# Patient Record
Sex: FEMALE | Race: WHITE | NOT HISPANIC OR LATINO | Employment: FULL TIME | URBAN - METROPOLITAN AREA
[De-identification: names, ages, dates, MRNs, and addresses within clinical notes are randomized per-mention and may not be internally consistent; named-entity substitution may affect disease eponyms.]

---

## 2022-05-25 ENCOUNTER — EVALUATION (OUTPATIENT)
Dept: PHYSICAL THERAPY | Facility: CLINIC | Age: 52
End: 2022-05-25
Payer: COMMERCIAL

## 2022-05-25 DIAGNOSIS — S83.241D TEAR OF MEDIAL MENISCUS OF RIGHT KNEE, CURRENT, UNSPECIFIED TEAR TYPE, SUBSEQUENT ENCOUNTER: Primary | ICD-10-CM

## 2022-05-25 PROCEDURE — 97161 PT EVAL LOW COMPLEX 20 MIN: CPT

## 2022-05-25 PROCEDURE — 97112 NEUROMUSCULAR REEDUCATION: CPT

## 2022-05-25 NOTE — LETTER
May 25, 2022    Luna Palm MD  [address] [place] [address]    Patient: Katherine Moore   YOB: 1970   Date of Visit: 2022     Encounter Diagnosis     ICD-10-CM    1  Tear of medial meniscus of right knee, current, unspecified tear type, subsequent encounter  S83 241D        Dear Dr Lucille Winter:    Thank you for your recent referral of Katherine Moore  Please review the attached evaluation summary from Kirsten's recent visit  Please verify that you agree with the plan of care by signing the attached order  If you have any questions or concerns, please do not hesitate to call  I sincerely appreciate the opportunity to share in the care of one of your patients and hope to have another opportunity to work with you in the near future  Sincerely,    Izabella Vieyra, PT      Referring Provider:      I certify that I have read the below Plan of Care and certify the need for these services furnished under this plan of treatment while under my care  Luna Palm MD  [address]  Via Fax: [phone]          PT Evaluation     Today's date: 2022  Patient name: Katherine Moore  : 1970  MRN: [id number]  Referring provider: Silver Johnson*  Dx:   Encounter Diagnosis     ICD-10-CM    1  Tear of medial meniscus of right knee, current, unspecified tear type, subsequent encounter  S83 241D        Start Time: 1530  Stop Time: 1630  Total time in clinic (min): 60 minutes    Assessment  Assessment details: Patient is a 45 yo female presenting to physical therapy with symptoms consistent with R knee medial meniscal tear that started about 6 months ago  Patient presents with decreased hip and knee strength, decreased R knee PROM and AROM, extensor lag and decreased activity tolerance  Patient has increased difficulty with standing, walking, stairs, floor transfers and kneeling   PT will address the noted impairments by performing hip and knee strengthening, stretching, balance, functional activities and manual techniques to allow the patient to return to her PLOF  PT recommended 2x/week for 4 weeks c a good prognosis 2* PLOF  Impairments: abnormal or restricted ROM, activity intolerance, impaired physical strength, lacks appropriate home exercise program, pain with function, poor posture  and poor body mechanics    Symptom irritability: lowUnderstanding of Dx/Px/POC: good   Prognosis: good    Goals  STG: In four weeks the patient will:    1  Be (I) with her HEP  2  Increase hip and knee strength to 4+/5 MMT score to assist c standing and stairs  3  Increase R knee flexion AROM to 120 degrees to assist c transfers and kneeling  LTG: In six weeks, the patient will:    1  Increase FOTO score to 74 to demonstrate improvements in symptoms and function  2  Demonstrate full R knee AROM without pain  3  Kneel on the floor with her grandchildren without pain  4  Increase hip and knee strength to 5/5 MMT score to assist c prolonged activities  5  Walk 1-2 miles without pain  6  Navigate the stairs without pain          Plan  Patient would benefit from: skilled physical therapy and PT eval  Planned modality interventions: cryotherapy and thermotherapy: hydrocollator packs  Planned therapy interventions: abdominal trunk stabilization, joint mobilization, manual therapy, massage, Browne taping, balance, body mechanics training, breathing training, neuromuscular re-education, patient education, postural training, strengthening, stretching, therapeutic activities, therapeutic exercise, transfer training, home exercise program, gait training, functional ROM exercises and flexibility  Frequency: 2x week  Duration in visits: 8  Duration in weeks: 4  Plan of Care beginning date: 5/25/2022  Plan of Care expiration date: 6/22/2022  Treatment plan discussed with: patient        Subjective Evaluation    History of Present Illness  Mechanism of injury: Patient has had R knee pain for a while, however about 6 months ago she noted an increase in pain and symptoms  Patient noted she is on the floor a lot with her grandchildren and she has increased pain with kneeling, standing, walking and stairs  Patient noted no TERESA  Patient reported no numbness or tingling  Patient noted she is planning on having surgery in the future due to her functional limitations  Recurrent probem    Quality of life: good    Pain  Current pain ratin  At best pain ratin  At worst pain ratin  Location: R medial knee  Quality: throbbing and discomfort  Relieving factors: rest  Aggravating factors: standing, walking, stair climbing and running  Progression: worsening    Social Support  Steps to enter house: yes  13  Lives in: [facility]  Lives with: spouse    Employment status: working ([employer] )  Hand dominance: right    Patient Goals  Patient goals for therapy: increased strength, independence with ADLs/IADLs, return to sport/leisure activities, increased motion, improved balance and decreased pain  Patient goal: "to get stronger " "to walk without pain " "to play with her grandchildren without pain "        Objective     Tenderness   Left Knee   Tenderness in the patellar tendon  Right Knee   Tenderness in the MCL (distal), MCL (proximal), medial joint line and medial patella  No tenderness in the lateral joint line and lateral patella       Neurological Testing     Sensation     Knee   Left Knee   Intact: light touch    Right Knee   Intact: light touch     Active Range of Motion   Left Knee   Normal active range of motion    Right Knee   Flexion: 110 degrees with pain  Extension: 5 degrees with pain    Passive Range of Motion   Left Knee   Normal passive range of motion    Right Knee   Flexion: 130 degrees with pain  Extension: 0 degrees with pain    Mobility   Patellar Mobility:   Left Knee   WFL: medial, lateral, superior and inferior  Right Knee   WFL: superior and inferior  Hypomobile: medial and lateral   Tibiofemoral Mobility:   Right knee Tibiofemoral tendons within functional limits include the anterior and posterior  Strength/Myotome Testing     Left Hip   Planes of Motion   Flexion: 4  Extension: 4  Abduction: 4  Adduction: 4-    Right Hip   Planes of Motion   Flexion: 4  Extension: 4-  Abduction: 4  Adduction: 4-    Left Knee   Flexion: 4+  Extension: 4+  Quadriceps contraction: good    Right Knee   Flexion: 3+  Extension: 3+  Quadriceps contraction: fair    Additional Strength Details  x10 SLR mild extensor lag present    Tests     Right Knee   Negative anterior drawer and posterior drawer         Flowsheet Rows    Flowsheet Row Most Recent Value   PT/OT G-Codes    Current Score 64   Projected Score 74   Assessment Type Evaluation             Precautions: hx of asthma -inhaler PRN      Manuals 5/25            Patellar mobs (sup/inf & med/lat) MW  Grade  III                                                   Neuro Re-Ed             SLR x10 ea            Quad set nv            Eccentric leg press nv            bridge x10            clams x10 ea                                      Ther Ex             X-ride nv            sidelying hip abd nv            Hip add nv            Hamstring stretch nv            Hip adductor stretch nv            ITB strap stretch nv            Piriformis stretch nv            TKE nv            Ther Activity                                       Gait Training                                       Modalities

## 2022-05-25 NOTE — PROGRESS NOTES
PT Evaluation     Today's date: 2022  Patient name: Joaquin Hughes  : 1970  MRN: [id number]  Referring provider: Margie French*  Dx:   Encounter Diagnosis     ICD-10-CM    1  Tear of medial meniscus of right knee, current, unspecified tear type, subsequent encounter  S83 241D        Start Time: 1530  Stop Time: 1630  Total time in clinic (min): 60 minutes    Assessment  Assessment details: Patient is a 45 yo female presenting to physical therapy with symptoms consistent with R knee medial meniscal tear that started about 6 months ago  Patient presents with decreased hip and knee strength, decreased R knee PROM and AROM, extensor lag and decreased activity tolerance  Patient has increased difficulty with standing, walking, stairs, floor transfers and kneeling  PT will address the noted impairments by performing hip and knee strengthening, stretching, balance, functional activities and manual techniques to allow the patient to return to her PLOF  PT recommended 2x/week for 4 weeks c a good prognosis 2* PLOF  Impairments: abnormal or restricted ROM, activity intolerance, impaired physical strength, lacks appropriate home exercise program, pain with function, poor posture  and poor body mechanics    Symptom irritability: lowUnderstanding of Dx/Px/POC: good   Prognosis: good    Goals  STG: In four weeks the patient will:    1  Be (I) with her HEP  2  Increase hip and knee strength to 4+/5 MMT score to assist c standing and stairs  3  Increase R knee flexion AROM to 120 degrees to assist c transfers and kneeling  LTG: In six weeks, the patient will:    1  Increase FOTO score to 74 to demonstrate improvements in symptoms and function  2  Demonstrate full R knee AROM without pain  3  Kneel on the floor with her grandchildren without pain  4  Increase hip and knee strength to 5/5 MMT score to assist c prolonged activities  5  Walk 1-2 miles without pain    6  Navigate the stairs without pain  Plan  Patient would benefit from: skilled physical therapy and PT eval  Planned modality interventions: cryotherapy and thermotherapy: hydrocollator packs  Planned therapy interventions: abdominal trunk stabilization, joint mobilization, manual therapy, massage, Browne taping, balance, body mechanics training, breathing training, neuromuscular re-education, patient education, postural training, strengthening, stretching, therapeutic activities, therapeutic exercise, transfer training, home exercise program, gait training, functional ROM exercises and flexibility  Frequency: 2x week  Duration in visits: 8  Duration in weeks: 4  Plan of Care beginning date: 2022  Plan of Care expiration date: 2022  Treatment plan discussed with: patient        Subjective Evaluation    History of Present Illness  Mechanism of injury: Patient has had R knee pain for a while, however about 6 months ago she noted an increase in pain and symptoms  Patient noted she is on the floor a lot with her grandchildren and she has increased pain with kneeling, standing, walking and stairs  Patient noted no TERESA  Patient reported no numbness or tingling  Patient noted she is planning on having surgery in the future due to her functional limitations             Recurrent probem    Quality of life: good    Pain  Current pain ratin  At best pain ratin  At worst pain ratin  Location: R medial knee  Quality: throbbing and discomfort  Relieving factors: rest  Aggravating factors: standing, walking, stair climbing and running  Progression: worsening    Social Support  Steps to enter house: yes  13  Lives in: [facility]  Lives with: spouse    Employment status: working ([employer] )  Hand dominance: right    Patient Goals  Patient goals for therapy: increased strength, independence with ADLs/IADLs, return to sport/leisure activities, increased motion, improved balance and decreased pain  Patient goal: "to get stronger " "to walk without pain " "to play with her grandchildren without pain "        Objective     Tenderness   Left Knee   Tenderness in the patellar tendon  Right Knee   Tenderness in the MCL (distal), MCL (proximal), medial joint line and medial patella  No tenderness in the lateral joint line and lateral patella  Neurological Testing     Sensation     Knee   Left Knee   Intact: light touch    Right Knee   Intact: light touch     Active Range of Motion   Left Knee   Normal active range of motion    Right Knee   Flexion: 110 degrees with pain  Extension: 5 degrees with pain    Passive Range of Motion   Left Knee   Normal passive range of motion    Right Knee   Flexion: 130 degrees with pain  Extension: 0 degrees with pain    Mobility   Patellar Mobility:   Left Knee   WFL: medial, lateral, superior and inferior  Right Knee   WFL: superior and inferior  Hypomobile: medial and lateral   Tibiofemoral Mobility:   Right knee Tibiofemoral tendons within functional limits include the anterior and posterior  Strength/Myotome Testing     Left Hip   Planes of Motion   Flexion: 4  Extension: 4  Abduction: 4  Adduction: 4-    Right Hip   Planes of Motion   Flexion: 4  Extension: 4-  Abduction: 4  Adduction: 4-    Left Knee   Flexion: 4+  Extension: 4+  Quadriceps contraction: good    Right Knee   Flexion: 3+  Extension: 3+  Quadriceps contraction: fair    Additional Strength Details  x10 SLR mild extensor lag present    Tests     Right Knee   Negative anterior drawer and posterior drawer         Flowsheet Rows    Flowsheet Row Most Recent Value   PT/OT G-Codes    Current Score 64   Projected Score 74   Assessment Type Evaluation             Precautions: hx of asthma -inhaler PRN      Manuals 5/25            Patellar mobs (sup/inf & med/lat) MW  Grade  III                                                   Neuro Re-Ed             SLR x10 ea            Quad set nv            Eccentric leg press nv bridge x10            clams x10 ea                                      Ther Ex             X-ride nv            sidelying hip abd nv            Hip add nv            Hamstring stretch nv            Hip adductor stretch nv            ITB strap stretch nv            Piriformis stretch nv            TKE nv            Ther Activity                                       Gait Training                                       Modalities

## 2022-05-31 ENCOUNTER — OFFICE VISIT (OUTPATIENT)
Dept: PHYSICAL THERAPY | Facility: CLINIC | Age: 52
End: 2022-05-31
Payer: COMMERCIAL

## 2022-05-31 DIAGNOSIS — S83.241D TEAR OF MEDIAL MENISCUS OF RIGHT KNEE, CURRENT, UNSPECIFIED TEAR TYPE, SUBSEQUENT ENCOUNTER: Primary | ICD-10-CM

## 2022-05-31 PROCEDURE — 97110 THERAPEUTIC EXERCISES: CPT

## 2022-05-31 PROCEDURE — 97112 NEUROMUSCULAR REEDUCATION: CPT

## 2022-05-31 NOTE — PROGRESS NOTES
Daily Note     Today's date: 2022  Patient name: Blanco Raymond  : 1970  MRN: [id number]  Referring provider: Karolina Rowell*  Dx:   Encounter Diagnosis     ICD-10-CM    1  Tear of medial meniscus of right knee, current, unspecified tear type, subsequent encounter  S83 241D        Start Time: 730  Stop Time: 823  Total time in clinic (min): 53 minutes    Subjective: Patient noted she was active this weekend and noted some soreness in the knee  Patient noted the knee is very stiff today  Objective: See treatment diary below      Assessment: Patient performed various strengthening exercises with min VCs for form  Patient noted some soreness with the bridges and piriformis stretch  PT educated the patient on performing exercises in pain free ranges  Patient would benefit from continued PT to allow the patient to return to her PLOF  Plan: Continue per plan of care        Precautions: hx of asthma -inhaler PRN      Manuals            Patellar mobs (sup/inf & med/lat) MW  Grade  III                                                   Neuro Re-Ed             SLR x10 ea x15 ea           Quad set nv nv           Eccentric leg press nv nv           bridge x10 x10           clams x10 ea x20 ea           3 way hip  nv                        Ther Ex             X-ride nv 10'           sidelying hip abd nv x25           Hip add nv x25  5" hold           Hamstring stretch nv 3x30" ea           Hip adductor stretch nv 3x30" ea           ITB strap stretch nv 3x30" ea           Piriformis stretch nv 3x30" ea           TKE nv nv           Ther Activity                                       Gait Training                                       Modalities

## 2022-06-02 ENCOUNTER — OFFICE VISIT (OUTPATIENT)
Dept: PHYSICAL THERAPY | Facility: CLINIC | Age: 52
End: 2022-06-02
Payer: COMMERCIAL

## 2022-06-02 DIAGNOSIS — S83.241D TEAR OF MEDIAL MENISCUS OF RIGHT KNEE, CURRENT, UNSPECIFIED TEAR TYPE, SUBSEQUENT ENCOUNTER: Primary | ICD-10-CM

## 2022-06-02 PROCEDURE — 97110 THERAPEUTIC EXERCISES: CPT

## 2022-06-02 PROCEDURE — 97112 NEUROMUSCULAR REEDUCATION: CPT

## 2022-06-02 NOTE — PROGRESS NOTES
Daily Note     Today's date: 2022  Patient name: Liyah Garcia  : 1970  MRN: [id number]  Referring provider: Emile Mitchell*  Dx:   Encounter Diagnosis     ICD-10-CM    1  Tear of medial meniscus of right knee, current, unspecified tear type, subsequent encounter  S83 241D        Start Time: 1115  Stop Time: 1200  Total time in clinic (min): 45 minutes    Subjective: Patient noted that she fell yesterday and her knee is a little sore  Patient noted that the stretches have been helping  Objective: See treatment diary below      Assessment: PT held progression of exercises due to her pain levels  Patient performed the exercises with min VCs  Patient noted some soreness in the R knee during the bridges  PT educated the patient on continuing her stretches to decrease pain  Patient would benefit from continued PT to allow the patient to return to her PLOF  Plan: Continue per plan of care        Precautions: hx of asthma -inhaler PRN      Manuals  6          Patellar mobs (sup/inf & med/lat) MW  Grade  III                                                   Neuro Re-Ed             SLR x10 ea x15 ea x15 ea          Quad set nv nv           Eccentric leg press nv nv           bridge x10 x10 x10          clams x10 ea x20 ea x20 ea          3 way hip  nv nv                       Ther Ex             X-ride nv 10' 10'          sidelying hip abd nv x25 x25 ea          Calf stretch   3x30" ea          Heel raises   2x10          Hip add nv x25  5" hold x25  5" hold          Hamstring stretch nv 3x30" ea 3x30" ea          Hip adductor stretch nv 3x30" ea 3x30" ea          ITB strap stretch nv 3x30" ea 3x30" ea          Piriformis stretch nv 3x30" ea 3x30"ea          TKE nv nv           Ther Activity                                       Gait Training                                       Modalities

## 2022-06-06 ENCOUNTER — OFFICE VISIT (OUTPATIENT)
Dept: PHYSICAL THERAPY | Facility: CLINIC | Age: 52
End: 2022-06-06
Payer: COMMERCIAL

## 2022-06-06 DIAGNOSIS — S83.241D TEAR OF MEDIAL MENISCUS OF RIGHT KNEE, CURRENT, UNSPECIFIED TEAR TYPE, SUBSEQUENT ENCOUNTER: Primary | ICD-10-CM

## 2022-06-06 PROCEDURE — 97112 NEUROMUSCULAR REEDUCATION: CPT

## 2022-06-06 PROCEDURE — 97110 THERAPEUTIC EXERCISES: CPT

## 2022-06-06 NOTE — PROGRESS NOTES
Daily Note     Today's date: 2022  Patient name: Radha Lino  : 1970  MRN: [id number]  Referring provider: Beto Luna*  Dx:   Encounter Diagnosis     ICD-10-CM    1  Tear of medial meniscus of right knee, current, unspecified tear type, subsequent encounter  S83 241D        Start Time: 1200  Stop Time: 1240  Total time in clinic (min): 40 minutes    Subjective: Patient noted she went biking this weekend with some pain in the knee  Patient noted she is sore in the knee today  Objective: See treatment diary below      Assessment: PT introduced the leg press to assist c strength  Patient performed without full knee extension due to soreness  Patient noted increased clicking during the session  Due to the increased clicking and pain, the session was limited  PT educated the patient on icing to decrease pain at home and continue to perform her stretches  Patient would benefit from continued PT to allow the patient to return to her PLOF  Plan: Continue per plan of care        Precautions: hx of asthma -inhaler PRN      Manuals          Patellar mobs (sup/inf & med/lat) MW  Grade  III                                                   Neuro Re-Ed             SLR x10 ea x15 ea x15 ea x10 R  x15 L         Quad set nv nv           Eccentric leg press nv nv  DL  40#  2x10  SL  20# R  30# L  x15 ea         bridge x10 x10 x10 hold         clams x10 ea x20 ea x20 ea x20 ea         3 way hip  nv nv nv                      Ther Ex             X-ride nv 10' 10' 10'         LAQ in pain free range    x15 ea         sidelying hip abd nv x25 x25 ea x20 ea         Calf stretch   3x30" ea          Heel raises   2x10          Hip add nv x25  5" hold x25  5" hold          Hamstring stretch nv 3x30" ea 3x30" ea 3x30" ea         Hip adductor stretch nv 3x30" ea 3x30" ea 3x30" ea         ITB strap stretch nv 3x30" ea 3x30" ea 3x30" ea         Piriformis stretch nv 3x30" ea 3x30"ea TKE nv nv           Ther Activity                                       Gait Training                                       Modalities

## 2022-06-08 ENCOUNTER — OFFICE VISIT (OUTPATIENT)
Dept: PHYSICAL THERAPY | Facility: CLINIC | Age: 52
End: 2022-06-08
Payer: COMMERCIAL

## 2022-06-08 DIAGNOSIS — S83.241D TEAR OF MEDIAL MENISCUS OF RIGHT KNEE, CURRENT, UNSPECIFIED TEAR TYPE, SUBSEQUENT ENCOUNTER: Primary | ICD-10-CM

## 2022-06-08 PROCEDURE — 97112 NEUROMUSCULAR REEDUCATION: CPT

## 2022-06-08 PROCEDURE — 97110 THERAPEUTIC EXERCISES: CPT

## 2022-06-08 NOTE — PROGRESS NOTES
Daily Note     Today's date: 2022  Patient name: Katherine Moore  : 1970  MRN: [id number]  Referring provider: Silver Johnson*  Dx:   Encounter Diagnosis     ICD-10-CM    1  Tear of medial meniscus of right knee, current, unspecified tear type, subsequent encounter  S83 241D        Start Time: 730  Stop Time: 825  Total time in clinic (min): 55 minutes    Subjective: Patient noted she took Advil prior to the session  Patient noted the knee has been sore over the past few days  Objective: See treatment diary below      Assessment: Patient performed exercises with min VCs for form throughout the session  PT educated the patient on her HEP as well as working in pain free ranges  PT educated the patient on using crutches for potential surgery  Patient amb with properly fit crutches in a non-weight bearing pattern (I)  Patient would benefit from continued PT to allow the patient to return to her PLOF  Plan: Continue per plan of care        Precautions: hx of asthma -inhaler PRN      Manuals         Patellar mobs (sup/inf & med/lat) MW  Grade  III                                                   Neuro Re-Ed             SLR x10 ea x15 ea x15 ea x10 R  x15 L x20 ea        Quad set nv nv           Eccentric leg press nv nv  DL  40#  2x10  SL  20# R  30# L  x15 ea DL  40#  2x10  SL  20# R  30# L  x15 ea        bridge x10 x10 x10 hold         clams x10 ea x20 ea x20 ea x20 ea x20 ea        3 way hip  nv nv nv hold        Crutch training education     MW        Ther Ex             X-ride nv 10' 10' 10' 10'        LAQ in pain free range    x15 ea         sidelying hip abd nv x25 x25 ea x20 ea x20 ea        Calf stretch   3x30" ea  3x30" ea        Heel raises   2x10  x20        Hip add nv x25  5" hold x25  5" hold          Hamstring stretch nv 3x30" ea 3x30" ea 3x30" ea 3x30" ea        Hip adductor stretch nv 3x30" ea 3x30" ea 3x30" ea 3x30" ea        ITB strap stretch nv 3x30" ea 3x30" ea 3x30" ea 3x30" ea        Piriformis stretch nv 3x30" ea 3x30"ea          TKE nv nv           Ther Activity                                       Gait Training                                       Modalities

## 2022-06-14 NOTE — PROGRESS NOTES
PT Discharge    Patient will be D/C from PT due to having surgery on 6/30/22  Patient will resume PT after surgery

## 2022-06-22 ENCOUNTER — APPOINTMENT (OUTPATIENT)
Dept: PHYSICAL THERAPY | Facility: CLINIC | Age: 52
End: 2022-06-22
Payer: COMMERCIAL

## 2022-07-05 ENCOUNTER — EVALUATION (OUTPATIENT)
Dept: PHYSICAL THERAPY | Facility: CLINIC | Age: 52
End: 2022-07-05
Payer: COMMERCIAL

## 2022-07-05 DIAGNOSIS — Z98.890 S/P ARTHROSCOPIC PARTIAL MEDIAL MENISCECTOMY: ICD-10-CM

## 2022-07-05 DIAGNOSIS — S83.241D TEAR OF MEDIAL MENISCUS OF RIGHT KNEE, CURRENT, UNSPECIFIED TEAR TYPE, SUBSEQUENT ENCOUNTER: Primary | ICD-10-CM

## 2022-07-05 PROCEDURE — 97112 NEUROMUSCULAR REEDUCATION: CPT

## 2022-07-05 PROCEDURE — 97110 THERAPEUTIC EXERCISES: CPT

## 2022-07-05 PROCEDURE — 97161 PT EVAL LOW COMPLEX 20 MIN: CPT

## 2022-07-05 NOTE — LETTER
2022    Carlene Mei MD  [address] [place] [id number]    Patient: Lizbeth Hudson   YOB: 1970   Date of Visit: 2022     Encounter Diagnosis     ICD-10-CM    1  Tear of medial meniscus of right knee, current, unspecified tear type, subsequent encounter  S83 241D    2  S/P arthroscopic partial medial meniscectomy  Z98 890        Dear Dr Jo Ann Jackson:    Thank you for your recent referral of Lizbeth Hudson  Please review the attached evaluation summary from Kirsten's recent visit  Please verify that you agree with the plan of care by signing the attached order  If you have any questions or concerns, please do not hesitate to call  I sincerely appreciate the opportunity to share in the care of one of your patients and hope to have another opportunity to work with you in the near future  Sincerely,    Antonia Jackson, PT      Referring Provider:      I certify that I have read the below Plan of Care and certify the need for these services furnished under this plan of treatment while under my care  Carlene Mei MD  [address] [place] [address]  Via Fax: [phone]          PT Evaluation     Today's date: 2022  Patient name: Lizbeth Hudson  : 1970  MRN: [id number]  Referring provider: Carly Padilla*  Dx:   Encounter Diagnosis     ICD-10-CM    1  Tear of medial meniscus of right knee, current, unspecified tear type, subsequent encounter  S83 241D    2  S/P arthroscopic partial medial meniscectomy  Z98 890        Start Time: 1445  Stop Time: 1545  Total time in clinic (min): 60 minutes    Assessment  Assessment details: Patient is a 47 yo female presenting to physical therapy s/p R partial medial meniscectomy on 22  Patient presents with decreased R knee AROM and PROM, decreased hip and knee strength and decreased activity tolerance  Patient is limited in stairs, amb long distances, transfers and kneeling   PT will address the noted impairments by performing hip and knee strengthening, stretching, balance, functional activities and manual techniques to allow the patient to return to her PLOF  PT recommended 1-2x/week for 4-6 weeks c a good prognosis 2* PLOF  Impairments: abnormal or restricted ROM, activity intolerance, impaired balance, impaired physical strength, lacks appropriate home exercise program, pain with function, poor posture  and poor body mechanics    Symptom irritability: lowUnderstanding of Dx/Px/POC: good   Prognosis: good    Goals  STG: In four weeks the patient will:    1  Be (I) with her HEP  2  Increase hip and knee strength to 4+/5 MMT score to assist c ADLs  3  Increase R knee flexion AROM to 127 degrees to assist c stairs and transfers  LTG: In six weeks, the patient will:    1  Increase FOTO score to 86 to demonstrate improvements in symptoms and function  2  Demonstrate full R knee AROM without pain  3  Negotiate a full flight of stairs without pain  4  Increase hip and knee strength to 5/5 MMT score to assist c prolonged walking  5  Walk a mile without pain  6  Knee on the ground without pain  7  Roll in bed without R knee pain  8  Transfer in the car without R knee pain          Plan  Patient would benefit from: skilled physical therapy and PT eval  Planned modality interventions: cryotherapy and thermotherapy: hydrocollator packs  Planned therapy interventions: abdominal trunk stabilization, joint mobilization, manual therapy, massage, Browne taping, neuromuscular re-education, balance, body mechanics training, breathing training, patient education, postural training, strengthening, stretching, therapeutic activities, therapeutic exercise, transfer training, flexibility, functional ROM exercises, home exercise program and gait training  Frequency: 2x week  Duration in visits: 12  Duration in weeks: 6  Plan of Care beginning date: 7/5/2022  Plan of Care expiration date: 2022  Treatment plan discussed with: patient Allegra Mckeon, SPT was present during the session)        Subjective Evaluation    History of Present Illness  Date of surgery: 2022  Mechanism of injury: surgery  Mechanism of injury: Patient is s/p R partial medial meniscectomy on 22  Patient noted no complications since surgery  Patient noted that yesterday she performed heel slides and noted some pain that was described as a bee sting  Patient noted that descending the steps feel like a pulling sensation in the knee  Patient walked with crutches for 3 days  Patient is currently icing on and off  Patient went back to work today and no increases swelling  Patient noted that rolling in bed is painful  Patient noted no pain will driving and has some pain with car transfers  Recurrent probem    Quality of life: good    Pain  Current pain ratin  At best pain ratin  At worst pain ratin  Location: R medial above joint line and lateral incision   Quality: burning (bee sting)  Relieving factors: rest and ice  Aggravating factors: standing, walking, stair climbing and running  Progression: improved    Social Support  Steps to enter house: yes  12  Lives in: [facility]  Lives with: spouse    Employment status: working  Hand dominance: right      Diagnostic Tests  No diagnostic tests performed  Patient Goals  Patient goals for therapy: increased strength, independence with ADLs/IADLs, return to sport/leisure activities, return to work, increased motion, improved balance and decreased pain  Patient goal: "to walk comfortably " "to work a full day without pain "        Objective     Observations     Right Knee   Positive for incision  Negative for edema  Additional Observation Details  Patient presents with two healing incisions with steri strips       Active Range of Motion   Left Knee   Normal active range of motion    Right Knee   Flexion: 99 degrees with pain  Extension: 4 degrees   Extensor la degrees     Additional Active Range of Motion Details  Patient performed x10 SLR with minimal extensor lag    Passive Range of Motion   Left Knee   Normal passive range of motion    Right Knee   Flexion: 100 degrees   Extension: 4 degrees     Mobility     Additional Mobility Details  Will be assessed at upcoming sessions       Strength/Myotome Testing     Left Hip   Planes of Motion   Flexion: 4+  Abduction: 4+  Adduction: 4+    Right Hip   Planes of Motion   Flexion: 4  Abduction: 4  Adduction: 4    Left Knee   Flexion: 5  Extension: 5    Right Knee   Flexion: 3  Extension: 3  Quadriceps contraction: fair      Flowsheet Rows    Flowsheet Row Most Recent Value   PT/OT G-Codes    Current Score 56   Projected Score 86   Assessment Type Evaluation             Precautions: s/p R partial meniscectomy 22      Manuals             Patellar mobs (infer/sup & medial/ lat) nv            R knee PROM nv                                      Neuro Re-Ed             Leg press nv            Quad set x10  5" hold            DL squat nv            Hip hikes nv            3 way hip nv            HEP edu MW                         Ther Ex             X-ride nv            Heel slides x10            SLR x10            LAQ x10            Hamstring stretch nv            Hip adductor stretch nv            ITB strap stretch nv                         Ther Activity                                       Gait Training                                       Modalities

## 2022-07-15 ENCOUNTER — OFFICE VISIT (OUTPATIENT)
Dept: PHYSICAL THERAPY | Facility: CLINIC | Age: 52
End: 2022-07-15
Payer: COMMERCIAL

## 2022-07-15 DIAGNOSIS — Z98.890 S/P ARTHROSCOPIC PARTIAL MEDIAL MENISCECTOMY: ICD-10-CM

## 2022-07-15 DIAGNOSIS — S83.241D TEAR OF MEDIAL MENISCUS OF RIGHT KNEE, CURRENT, UNSPECIFIED TEAR TYPE, SUBSEQUENT ENCOUNTER: Primary | ICD-10-CM

## 2022-07-15 PROCEDURE — 97110 THERAPEUTIC EXERCISES: CPT

## 2022-07-15 NOTE — PROGRESS NOTES
Daily Note     Today's date: 7/15/2022  Patient name: Mony Cardoza  : 1970  MRN: [id number]  Referring provider: Jagdeep Tse*  Dx:   Encounter Diagnosis     ICD-10-CM    1  Tear of medial meniscus of right knee, current, unspecified tear type, subsequent encounter  S83 241D    2  S/P arthroscopic partial medial meniscectomy  Z98 890        Start Time: 0730  Stop Time: 0800  Total time in clinic (min): 30 minutes    Subjective: Patient noted that she is continuing to ice on a daily basis  Patient noted that bending continues to be a little tough  Patient noted the knee is feeling better when descending the steps at her home  Objective: See treatment diary below      Assessment: Patient performed various strengthening and stretching exercising with min VCs  Patient noted some tenderness with the knee touching the mat in a prone and sidelying position  PT performed patellar mobilizations with noted restrictions in the superior direction  PT educated the patient on HEP and resting her knee over the weekend  Patient would benefit from continued PT to allow the patient to return to her PLOF  Plan: Continue per plan of care        Precautions: s/p R partial meniscectomy 22      Manuals 7/5 7/15           Patellar mobs (infer/sup & medial/ lat) nv Mw  Grade  II-III           R knee PROM nv                                      Neuro Re-Ed             Leg press nv hold           Quad set x10  5" hold            DL squat nv            Hip hikes nv            3 way hip nv            HEP edu MW MW                        Ther Ex             X-ride nv 10'           Heel slides x10 x10           sidelying hip abd  x10 ea           Hip ext in prone  x10 ea           SLR x10 x10 ea           LAQ x10 x10           Hamstring stretch nv 3x30"           Hip adductor stretch nv 3x30" ea           ITB strap stretch nv            Ball curl in   3'           Ther Activity Gait Training                                       Modalities

## 2022-07-18 ENCOUNTER — OFFICE VISIT (OUTPATIENT)
Dept: PHYSICAL THERAPY | Facility: CLINIC | Age: 52
End: 2022-07-18
Payer: COMMERCIAL

## 2022-07-18 DIAGNOSIS — Z98.890 S/P ARTHROSCOPIC PARTIAL MEDIAL MENISCECTOMY: ICD-10-CM

## 2022-07-18 DIAGNOSIS — S83.241D TEAR OF MEDIAL MENISCUS OF RIGHT KNEE, CURRENT, UNSPECIFIED TEAR TYPE, SUBSEQUENT ENCOUNTER: Primary | ICD-10-CM

## 2022-07-18 PROCEDURE — 97110 THERAPEUTIC EXERCISES: CPT

## 2022-07-18 PROCEDURE — 97140 MANUAL THERAPY 1/> REGIONS: CPT

## 2022-07-18 PROCEDURE — 97112 NEUROMUSCULAR REEDUCATION: CPT

## 2022-07-18 NOTE — PROGRESS NOTES
Daily Note     Today's date: 2022  Patient name: Ramsey Black  : 1970  MRN: [id number]  Referring provider: Nadja Nash*  Dx:   Encounter Diagnosis     ICD-10-CM    1  Tear of medial meniscus of right knee, current, unspecified tear type, subsequent encounter  S83 241D    2  S/P arthroscopic partial medial meniscectomy  Z98 890        Start Time: 730  Stop Time: 815  Total time in clinic (min): 45 minutes    Subjective: Patient noted no pain with walking or sit to stand transfers, however she felt some pain after performing LAQ  Patient noted after a rest, this decreased  Patient noted no pain at the start of the session  Patient noted she continues to sleep in her recliner because lying in her bed is painful  Objective: See treatment diary below      Assessment: PT performed scar massage to assist c ROM and pain levels  PT educated the patient on performing scar massage at home as well as desensitization techniques to assist c sleeping positions  PT introduced 3 way hip, squats and hip hikes to assist c stabilization  Patient would benefit from continued PT to allow the patient to return to her PLOF  Plan: Continue per plan of care        Precautions: s/p R partial meniscectomy 22      Manuals 7/5 7/15 7/18          Patellar mobs (infer/sup & medial/ lat) nv Mw  Grade  II-III MW  Grade  III          R knee PROM nv            Scar massage   MW                       Neuro Re-Ed             Leg press nv hold           Quad set x10  5" hold            DL squat nv  x10          Hip hikes nv  x15 ea          3 way hip nv  x15 ea(B)          HEP edu MW MW MW + desesitization techniuqes                       Ther Ex             X-ride nv 10' 10'          Heel slides x10 x10           sidelying hip abd  x10 ea           Hip ext in prone  x10 ea           SLR x10 x10 ea           LAQ x10 x10           Hamstring stretch nv 3x30"           Hip adductor stretch nv 3x30" ea ITB strap stretch nv            Ball curl in   3'           Ther Activity                                       Gait Training                                       Modalities

## 2022-07-25 ENCOUNTER — OFFICE VISIT (OUTPATIENT)
Dept: PHYSICAL THERAPY | Facility: CLINIC | Age: 52
End: 2022-07-25
Payer: COMMERCIAL

## 2022-07-25 DIAGNOSIS — Z98.890 S/P ARTHROSCOPIC PARTIAL MEDIAL MENISCECTOMY: ICD-10-CM

## 2022-07-25 DIAGNOSIS — S83.241D TEAR OF MEDIAL MENISCUS OF RIGHT KNEE, CURRENT, UNSPECIFIED TEAR TYPE, SUBSEQUENT ENCOUNTER: Primary | ICD-10-CM

## 2022-07-25 PROCEDURE — 97110 THERAPEUTIC EXERCISES: CPT

## 2022-07-25 PROCEDURE — 97140 MANUAL THERAPY 1/> REGIONS: CPT

## 2022-07-25 NOTE — LETTER
2022    Jennifer Hinkle MD  [address] [place] [address]    Patient: Claudette Fake   YOB: 1970   Date of Visit: 2022     Encounter Diagnosis     ICD-10-CM    1  Tear of medial meniscus of right knee, current, unspecified tear type, subsequent encounter  S83 241D    2  S/P arthroscopic partial medial meniscectomy  Z98 890        Dear Dr Davon Moctezuma:    Thank you for your recent referral of Claudette Fake  Please review the attached evaluation summary from Kirsten's recent visit  Please verify that you agree with the plan of care by signing the attached order  If you have any questions or concerns, please do not hesitate to call  I sincerely appreciate the opportunity to share in the care of one of your patients and hope to have another opportunity to work with you in the near future  Sincerely,    César Torres, PT      Referring Provider:      I certify that I have read the below Plan of Care and certify the need for these services furnished under this plan of treatment while under my care  Jennifer Hinkle MD  [facility]  Via Fax: [phone]          PT Re-Evaluation     Today's date: 2022  Patient name: Claudette Fake  : 1970  MRN: [id number]  Referring provider: Abi Parr*  Dx:   Encounter Diagnosis     ICD-10-CM    1  Tear of medial meniscus of right knee, current, unspecified tear type, subsequent encounter  S83 241D    2  S/P arthroscopic partial medial meniscectomy  Z98 890        Start Time: 0730  Stop Time: 0815  Total time in clinic (min): 45 minutes    Assessment  Assessment details: Patient is a 46 yo female presenting to physical therapy s/p R partial medial meniscectomy on 22  Since starting PT the patient has progressed with AROM and PROM, strength, endurance and activity tolerance   Patient continues to present with swelling surrounding the healed incisions as well as a tightness surrounding the patellar tendon  Patient continues to be limited in prolonged sitting to a standing position, rolling in bed, lifting and descending the steps  PT will continue to address the noted impairments by performing hip and knee strengthening, stretching, balance, functional activities and manual techniques to allow the patient to return to her PLOF  PT recommended 2x/week for 4-6 weeks c a good prognosis 2* noted improvements  Impairments: activity intolerance, impaired balance, impaired physical strength, lacks appropriate home exercise program, pain with function, poor posture  and poor body mechanics    Symptom irritability: lowUnderstanding of Dx/Px/POC: good   Prognosis: good    Goals  STG: In four weeks the patient will:    1  Be (I) with her HEP  (in progress)  2  Increase hip and knee strength to 4+/5 MMT score to assist c ADLs  (in progress)  3  Increase R knee flexion AROM to 127 degrees to assist c stairs and transfers  (MET)      LTG: In six weeks, the patient will:    1  Increase FOTO score to 86 to demonstrate improvements in symptoms and function  (in progress)  2  Demonstrate full R knee AROM without pain (MET)  3  Negotiate a full flight of stairs without pain  (in progress)  4  Increase hip and knee strength to 5/5 MMT score to assist c prolonged walking (in progress)  5  Walk a mile without pain (in progress)  6  Knee on the ground without pain (in progress)  7  Roll in bed without R knee pain (in progress)  8   Transfer in the car without R knee pain   (in progress)      Plan  Patient would benefit from: skilled physical therapy and PT eval  Planned modality interventions: cryotherapy and thermotherapy: hydrocollator packs  Planned therapy interventions: abdominal trunk stabilization, joint mobilization, manual therapy, massage, Browne taping, neuromuscular re-education, balance, body mechanics training, breathing training, patient education, postural training, strengthening, stretching, therapeutic activities, therapeutic exercise, transfer training, flexibility, functional ROM exercises, home exercise program and gait training  Frequency: 2x week  Duration in visits: 12  Duration in weeks: 6  Plan of Care beginning date: 2022  Plan of Care expiration date: 2022  Treatment plan discussed with: patient Kevon Dan, SPT was present during the session)        Subjective Evaluation    History of Present Illness  Date of surgery: 2022  Mechanism of injury: surgery  Mechanism of injury: Patient is s/p R partial medial meniscectomy on 22  Patient noted that she does not have pain  Patient is mainly feeling tightness around the whole knee  Patinet noted that she has increased tightness with sitting for a period of time and then walking, descending steps, walking in the pool and lifting > 20#s  Patient noted that the swelling continues to limit her activities  Patient noted that rolling in bed continues to be troublesome  Recurrent probem    Quality of life: good    Pain  Current pain ratin  At best pain ratin  At worst pain ratin  Location: R medial patella  Quality: burning and tight  Relieving factors: rest and ice  Aggravating factors: standing, walking, stair climbing and running  Progression: improved    Social Support  Steps to enter house: yes  12  Lives in: [facility]  Lives with: spouse    Employment status: working  Hand dominance: right      Diagnostic Tests  No diagnostic tests performed  Patient Goals  Patient goals for therapy: increased strength, independence with ADLs/IADLs, return to sport/leisure activities, increased motion, improved balance and decreased pain  Patient goal: "to walk comfortably  "(in progress) "to work a full day without pain " (in progress)        Objective     Observations     Right Knee   Positive for edema  Negative for incision       Additional Observation Details  Patient presents with two healed incisions and some swelling       Active Range of Motion   Left Knee   Normal active range of motion    Right Knee   Flexion: 135 (tightness) degrees   Extension: 0 degrees   Extensor la degrees     Additional Active Range of Motion Details  Patient performed x10 SLR with no extensor lag    Passive Range of Motion   Left Knee   Normal passive range of motion    Right Knee   Flexion: 135 degrees   Extension: 0 degrees     Mobility   Patellar Mobility:     Right Knee   WFL: medial and lateral  Hypomobile: superior and inferior     Strength/Myotome Testing     Left Hip   Planes of Motion   Flexion: 4+  Abduction: 4+  Adduction: 4+    Right Hip   Planes of Motion   Flexion: 4+  Abduction: 4  Adduction: 4+    Left Knee   Flexion: 5  Extension: 5    Right Knee   Flexion: 4  Extension: 4  Quadriceps contraction: fair             Precautions: s/p R partial meniscectomy 22      Manuals 7/5 7/15 7/18 7/25         Patellar mobs (infer/sup & medial/ lat) nv Mw  Grade  II-III MW  Grade  III MW  Grade  III         R knee PROM nv            Scar massage   MW MW         RE    MW         Neuro Re-Ed             Leg press nv hold           Quad set x10  5" hold            DL squat nv  x10          Hip hikes nv  x15 ea x15 ea         3 way hip nv  x15 ea(B) x15 ea (B)         HEP edu MW MW MW + desesitization techniuqes MW                      Ther Ex             Lucio Katherine nv 10' 10' 10'         Heel slides x10 x10           sidelying hip abd  x10 ea  x15 ea         Ball squeeze    x15  5" hold         Hip ext in prone  x10 ea           SLR x10 x10 ea           LAQ x10 x10           Hamstring stretch nv 3x30"           Hip adductor stretch nv 3x30" ea           ITB strap stretch nv            Ball curl in   3'           Ther Activity                                       Gait Training                                       Modalities

## 2022-07-25 NOTE — PROGRESS NOTES
PT Re-Evaluation     Today's date: 2022  Patient name: Mariann Mukherjee  : 1970  MRN: [id number]  Referring provider: Nils Machuca  Dx:   Encounter Diagnosis     ICD-10-CM    1  Tear of medial meniscus of right knee, current, unspecified tear type, subsequent encounter  S83 241D    2  S/P arthroscopic partial medial meniscectomy  [id number] [id number]        Start Time: 730  Stop Time: 0815  Total time in clinic (min): 45 minutes    Assessment  Assessment details: Patient is a 44 yo female presenting to physical therapy s/p R partial medial meniscectomy on 22  Since starting PT the patient has progressed with AROM and PROM, strength, endurance and activity tolerance  Patient continues to present with swelling surrounding the healed incisions as well as a tightness surrounding the patellar tendon  Patient continues to be limited in prolonged sitting to a standing position, rolling in bed, lifting and descending the steps  PT will continue to address the noted impairments by performing hip and knee strengthening, stretching, balance, functional activities and manual techniques to allow the patient to return to her PLOF  PT recommended 2x/week for 4-6 weeks c a good prognosis 2* noted improvements  Impairments: activity intolerance, impaired balance, impaired physical strength, lacks appropriate home exercise program, pain with function, poor posture  and poor body mechanics    Symptom irritability: lowUnderstanding of Dx/Px/POC: good   Prognosis: good    Goals  STG: In four weeks the patient will:    1  Be (I) with her HEP  (in progress)  2  Increase hip and knee strength to 4+/5 MMT score to assist c ADLs  (in progress)  3  Increase R knee flexion AROM to 127 degrees to assist c stairs and transfers  (MET)      LTG: In six weeks, the patient will:    1  Increase FOTO score to 86 to demonstrate improvements in symptoms and function  (in progress)  2   Demonstrate full R knee AROM without pain (MET)  3  Negotiate a full flight of stairs without pain  (in progress)  4  Increase hip and knee strength to 5/5 MMT score to assist c prolonged walking (in progress)  5  Walk a mile without pain (in progress)  6  Knee on the ground without pain (in progress)  7  Roll in bed without R knee pain (in progress)  8  Transfer in the car without R knee pain   (in progress)      Plan  Patient would benefit from: skilled physical therapy and PT eval  Planned modality interventions: cryotherapy and thermotherapy: hydrocollator packs  Planned therapy interventions: abdominal trunk stabilization, joint mobilization, manual therapy, massage, Browne taping, neuromuscular re-education, balance, body mechanics training, breathing training, patient education, postural training, strengthening, stretching, therapeutic activities, therapeutic exercise, transfer training, flexibility, functional ROM exercises, home exercise program and gait training  Frequency: 2x week  Duration in visits: 12  Duration in weeks: 6  Plan of Care beginning date: 2022  Plan of Care expiration date: 2022  Treatment plan discussed with: patient Opp Night, SPT was present during the session)        Subjective Evaluation    History of Present Illness  Date of surgery: 2022  Mechanism of injury: surgery  Mechanism of injury: Patient is s/p R partial medial meniscectomy on 22  Patient noted that she does not have pain  Patient is mainly feeling tightness around the whole knee  Patinet noted that she has increased tightness with sitting for a period of time and then walking, descending steps, walking in the pool and lifting > 20#s  Patient noted that the swelling continues to limit her activities  Patient noted that rolling in bed continues to be troublesome             Recurrent probem    Quality of life: good    Pain  Current pain ratin  At best pain ratin  At worst pain ratin  Location: R medial patella  Quality: burning and tight  Relieving factors: rest and ice  Aggravating factors: standing, walking, stair climbing and running  Progression: improved    Social Support  Steps to enter house: yes  12  Lives in: [place]  Lives with: spouse    Employment status: working  Hand dominance: right      Diagnostic Tests  No diagnostic tests performed  Patient Goals  Patient goals for therapy: increased strength, independence with ADLs/IADLs, return to sport/leisure activities, increased motion, improved balance and decreased pain  Patient goal: "to walk comfortably  "(in progress) "to work a full day without pain " (in progress)        Objective     Observations     Right Knee   Positive for edema  Negative for incision  Additional Observation Details  Patient presents with two healed incisions and some swelling       Active Range of Motion   Left Knee   Normal active range of motion    Right Knee   Flexion: 135 (tightness) degrees   Extension: 0 degrees   Extensor la degrees     Additional Active Range of Motion Details  Patient performed x10 SLR with no extensor lag    Passive Range of Motion   Left Knee   Normal passive range of motion    Right Knee   Flexion: 135 degrees   Extension: 0 degrees     Mobility   Patellar Mobility:     Right Knee   WFL: medial and lateral  Hypomobile: superior and inferior     Strength/Myotome Testing     Left Hip   Planes of Motion   Flexion: 4+  Abduction: 4+  Adduction: 4+    Right Hip   Planes of Motion   Flexion: 4+  Abduction: 4  Adduction: 4+    Left Knee   Flexion: 5  Extension: 5    Right Knee   Flexion: 4  Extension: 4  Quadriceps contraction: fair             Precautions: s/p R partial meniscectomy 22      Manuals 7/5 7/15 7/18 7/25         Patellar mobs (infer/sup & medial/ lat) nv Mw  Grade  II-III MW  Grade  III MW  Grade  III         R knee PROM nv            Scar massage   MW MW         RE    MW         Neuro Re-Ed             Leg press nv hold           Quad set x10  5" hold            DL squat nv  x10          Hip hikes nv  x15 ea x15 ea         3 way hip nv  x15 ea(B) x15 ea (B)         HEP edu MW MW MW + desesitization techniuqes MW                      Ther Ex             [facility]'         Heel slides x10 x10           sidelying hip abd  x10 ea  x15 ea         Ball squeeze    x15  5" hold         Hip ext in prone  x10 ea           SLR x10 x10 ea           LAQ x10 x10           Hamstring stretch nv 3x30"           Hip adductor stretch nv 3x30" ea           ITB strap stretch nv            Ball curl in   3'           Ther Activity                                       Gait Training                                       Modalities

## 2022-08-01 ENCOUNTER — OFFICE VISIT (OUTPATIENT)
Dept: PHYSICAL THERAPY | Facility: CLINIC | Age: 52
End: 2022-08-01
Payer: COMMERCIAL

## 2022-08-01 DIAGNOSIS — Z98.890 S/P ARTHROSCOPIC PARTIAL MEDIAL MENISCECTOMY: ICD-10-CM

## 2022-08-01 DIAGNOSIS — S83.241D TEAR OF MEDIAL MENISCUS OF RIGHT KNEE, CURRENT, UNSPECIFIED TEAR TYPE, SUBSEQUENT ENCOUNTER: Primary | ICD-10-CM

## 2022-08-01 PROCEDURE — 97110 THERAPEUTIC EXERCISES: CPT

## 2022-08-01 PROCEDURE — 97140 MANUAL THERAPY 1/> REGIONS: CPT

## 2022-08-01 NOTE — PROGRESS NOTES
Daily Note     Today's date: 2022  Patient name: Soledad Stiles  : 1970  MRN: [id number]  Referring provider: Aravind Bergeron*  Dx:   Encounter Diagnosis     ICD-10-CM    1  Tear of medial meniscus of right knee, current, unspecified tear type, subsequent encounter  S83 241D    2  S/P arthroscopic partial medial meniscectomy  [id number]        Start Time: 730  Stop Time: 08  Total time in clinic (min): 30 minutes    Subjective: Patient noted that she sat on a motorcycle for 5 hours yesterday with notes of increased swelling and pain  Patient noted that she also increased her weight on the knee extension machine at home  Objective: See treatment diary below      Assessment: PT educated the patient on elevating and icing at the end of the day to decrease her pain/ swelling  PT also educated the patient to stop the knee extension machine at home due to increased pain  Patient noted a clicking sensation at the patella during knee flexion and extension  Patient noted no increased pain, however the sound bothers her  Patient presented with extensor lag during the session  PT continues to educated the patient on the speed at which she performs her exercises  PT performed STM to the pes anserine to assist with pain  Patient noted a decrease in pain post session  PT educated the patient on her HEP  Patient would benefit from continued PT to allow the patient to return to her PLOF  Plan: Continue per plan of care        Precautions: s/p R partial meniscectomy 22      Manuals 7/5 7/15 7/18 7/25 8/1        Patellar mobs (infer/sup & medial/ lat) nv Mw  Grade  II-III MW  Grade  III MW  Grade  III MW  grade  III        R knee PROM nv            Scar massage   MW MW MW        Pes anserine STM     MW        RE    MW         Neuro Re-Ed             Leg press nv hold           Quad set x10  5" hold            DL squat nv  x10          Hip hikes nv  x15 ea x15 ea         3 way hip nv  x15 ea(B) x15 ea (B)         HEP edu MW MW MW + desesitization techniuqes MW MW                     Ther Ex             Fely [employer] 10' 10' 10' 10'        Heel slides x10 x10           sidelying hip abd  x10 ea  x15 ea x25 ea        Ball squeeze    x15  5" hold HEP        Hip ext in prone  x10 ea           SLR x10 x10 ea   x20 ea mod cues        LAQ x10 x10           Hamstring stretch nv 3x30"           Hip adductor stretch nv 3x30" ea           ITB strap stretch nv            Ball curl in   3'           Ther Activity                                       Gait Training                                       Modalities

## 2022-08-11 ENCOUNTER — APPOINTMENT (OUTPATIENT)
Dept: PHYSICAL THERAPY | Facility: CLINIC | Age: 52
End: 2022-08-11
Payer: COMMERCIAL